# Patient Record
Sex: MALE | Race: WHITE | NOT HISPANIC OR LATINO | Employment: FULL TIME | URBAN - METROPOLITAN AREA
[De-identification: names, ages, dates, MRNs, and addresses within clinical notes are randomized per-mention and may not be internally consistent; named-entity substitution may affect disease eponyms.]

---

## 2023-11-29 ENCOUNTER — OFFICE VISIT (OUTPATIENT)
Dept: FAMILY MEDICINE CLINIC | Facility: CLINIC | Age: 28
End: 2023-11-29
Payer: COMMERCIAL

## 2023-11-29 VITALS
OXYGEN SATURATION: 97 % | BODY MASS INDEX: 35.31 KG/M2 | DIASTOLIC BLOOD PRESSURE: 94 MMHG | HEIGHT: 76 IN | TEMPERATURE: 97.6 F | WEIGHT: 290 LBS | HEART RATE: 113 BPM | SYSTOLIC BLOOD PRESSURE: 174 MMHG

## 2023-11-29 DIAGNOSIS — F41.0 PANIC ATTACKS: ICD-10-CM

## 2023-11-29 DIAGNOSIS — R41.840 DIFFICULTY CONCENTRATING: ICD-10-CM

## 2023-11-29 DIAGNOSIS — R03.0 ELEVATED BLOOD PRESSURE READING: ICD-10-CM

## 2023-11-29 DIAGNOSIS — F41.9 ANXIETY: Primary | ICD-10-CM

## 2023-11-29 PROCEDURE — 99203 OFFICE O/P NEW LOW 30 MIN: CPT | Performed by: PHYSICIAN ASSISTANT

## 2023-11-29 RX ORDER — ESCITALOPRAM OXALATE 5 MG/1
5 TABLET ORAL DAILY
Qty: 30 TABLET | Refills: 5 | Status: SHIPPED | OUTPATIENT
Start: 2023-11-29

## 2023-11-29 RX ORDER — ALPRAZOLAM 0.5 MG/1
0.5 TABLET ORAL DAILY PRN
Qty: 15 TABLET | Refills: 0 | Status: SHIPPED | OUTPATIENT
Start: 2023-11-29

## 2023-11-29 NOTE — PROGRESS NOTES
Name: Ranjan Wise      : 1995      MRN: [id number]  Encounter Provider: Mary Muñoz PA-C  Encounter Date: 2023   Encounter department: [facility] PRIMARY CARE    Assessment & Plan     Patient Instructions   Assessment/plan:  1. Anxiety-patient is having frequent panic attacks. We discussed treatment options and he is agreeable to starting Lexapro 5 mg daily. We will also order some blood test to be completed including CBC, CMP, lipids, and TSH. We will schedule follow-up in the next 4 weeks to reevaluate therapy and titrate if necessary. Patient is also having panic attacks to the point where he has called EMS. He will be given a small supply of Xanax 0.5 mg to be used as necessary for this situation. Patient verbalizes understanding and agreement with plan. 2.  Hypertension-not at goal.  Numbers are likely elevated secondary to anxiety. We discussed the possibility of adding metoprolol if they are continuingly elevated. I would recommend he try to get a home cuff so he can monitor more frequently. 3.  Decreased concentration-likely secondary to anxiety symptoms. If symptoms are persistent and not improving with Lexapro therapy or consider further evaluation by psychiatrist for ADHD evaluation. Follow-up with blood work results in the next 4 weeks. 1. Anxiety  -     CBC and differential  -     Comprehensive metabolic panel  -     Lipid Panel with Direct LDL reflex  -     TSH, 3rd generation with Free T4 reflex    2. Panic attacks  -     Comprehensive metabolic panel  -     Lipid Panel with Direct LDL reflex  -     TSH, 3rd generation with Free T4 reflex  -     escitalopram (LEXAPRO) 5 mg tablet; Take 1 tablet (5 mg total) by mouth daily  -     ALPRAZolam (XANAX) 0.5 mg tablet; Take 1 tablet (0.5 mg total) by mouth daily as needed for anxiety    3. Elevated blood pressure reading    4.  Difficulty concentrating        Depression Screening and Follow-up Plan: Patient was screened for depression during today's encounter. They screened negative with a PHQ-2 score of 0. Subjective      HPI: This is a 67-year-old gentleman that presents to the office as a new patient to discuss panic attacks. Patient has been having more frequent episodes, they have also been more severe. He had one the other week that he actually had to go to the hospital by EMS because of his symptoms being so significant. They eventually resolved on their own. His blood pressure was quite elevated at the time of his panic attack however and he wanted to follow-up with that as well. He has been trying to reduce caffeine and energy drinks in his diet. He is also stopped previous use of nicotine supplements or pouches. He does smoke medical marijuana for anxiety symptoms but sometimes feels that it actually makes his anxiety worse. He does admit to some family history of anxiety in his father. Typically anxiety attacks are spontaneous and do not seem to be brought on by anything particular. For the most part he does sleep okay at nighttime. Review of Systems   Constitutional:  Negative for chills, fatigue and fever. HENT:  Negative for congestion, ear pain and sinus pressure. Eyes:  Negative for visual disturbance. Respiratory:  Negative for cough, chest tightness and shortness of breath. Cardiovascular:  Negative for chest pain and palpitations. Gastrointestinal:  Negative for diarrhea, nausea and vomiting. Endocrine: Negative for polyuria. Genitourinary:  Negative for dysuria and frequency. Musculoskeletal:  Negative for arthralgias and myalgias. Skin:  Negative for pallor and rash. Neurological:  Negative for dizziness, weakness, light-headedness, numbness and headaches. Psychiatric/Behavioral:  Positive for decreased concentration. Negative for agitation, behavioral problems, self-injury, sleep disturbance and suicidal ideas. The patient is nervous/anxious.     All other systems reviewed and are negative. Current Outpatient Medications on File Prior to Visit   Medication Sig   • patient supplied medication AG1 supplement       Objective     BP (!) 174/94 (BP Location: Left arm, Patient Position: Sitting, Cuff Size: Large)   Pulse (!) 113   Temp 97.6 °F (36.4 °C) (Temporal)   Ht 6' 4" (1.93 m)   Wt 132 kg (290 lb)   SpO2 97%   BMI 35.30 kg/m²     Physical Exam  Vitals and nursing note reviewed. Constitutional:       General: He is not in acute distress. Appearance: He is well-developed. HENT:      Head: Normocephalic and atraumatic. Right Ear: External ear normal.      Left Ear: External ear normal.      Nose: Nose normal.      Mouth/Throat:      Pharynx: No oropharyngeal exudate. Eyes:      Conjunctiva/sclera: Conjunctivae normal.      Pupils: Pupils are equal, round, and reactive to light. Neck:      Thyroid: No thyromegaly. Trachea: No tracheal deviation. Cardiovascular:      Rate and Rhythm: Normal rate and regular rhythm. Heart sounds: Normal heart sounds. No murmur heard. No friction rub. Pulmonary:      Effort: Pulmonary effort is normal. No respiratory distress. Breath sounds: Normal breath sounds. No wheezing or rales. Abdominal:      General: Bowel sounds are normal. There is no distension. Palpations: Abdomen is soft. Tenderness: There is no abdominal tenderness. There is no guarding or rebound. Musculoskeletal:         General: No tenderness. Normal range of motion. Cervical back: Normal range of motion and neck supple. Lymphadenopathy:      Cervical: No cervical adenopathy. Skin:     General: Skin is warm and dry. Findings: No erythema or rash. Neurological:      Mental Status: He is alert and oriented to person, place, and time. Cranial Nerves: No cranial nerve deficit.       Coordination: Coordination normal.   Psychiatric:         Behavior: Behavior normal.         Thought Content: Thought content normal.       Manuel Reyes PA-C

## 2023-11-29 NOTE — PATIENT INSTRUCTIONS
Assessment/plan:  1. Anxiety-patient is having frequent panic attacks. We discussed treatment options and he is agreeable to starting Lexapro 5 mg daily. We will also order some blood test to be completed including CBC, CMP, lipids, and TSH. We will schedule follow-up in the next 4 weeks to reevaluate therapy and titrate if necessary. Patient is also having panic attacks to the point where he has called EMS. He will be given a small supply of Xanax 0.5 mg to be used as necessary for this situation. Patient verbalizes understanding and agreement with plan. 2.  Hypertension-not at goal.  Numbers are likely elevated secondary to anxiety. We discussed the possibility of adding metoprolol if they are continuingly elevated. I would recommend he try to get a home cuff so he can monitor more frequently. 3.  Decreased concentration-likely secondary to anxiety symptoms. If symptoms are persistent and not improving with Lexapro therapy or consider further evaluation by psychiatrist for ADHD evaluation. Follow-up with blood work results in the next 4 weeks.

## 2023-11-29 NOTE — PROGRESS NOTES
ADULT ANNUAL [address] PRIMARY CARE    NAME: Nelsy Abel  AGE: 33 y.o. SEX: male  : 1995     DATE: 2023     Assessment and Plan:     Problem List Items Addressed This Visit    None  Visit Diagnoses     Panic attacks    -  Primary    Healthcare maintenance              Immunizations and preventive care screenings were discussed with patient today. Appropriate education was printed on patient's after visit summary. {Prostate cancer screening - consider in males between age of 37-78 depending on age, race, and risk factors. There is difference in the guidelines in regards to the optimal age for screening. USPSTF states to consider periodic screening in males between the age of 48 to 71. This text is informational and does not need to be selected but if you wish, you can insert standard documentation in your progress note by using F2 (Optional):14173}    Counseling:  {Annual Physical; Counselin}      Depression Screening and Follow-up Plan: Patient was screened for depression during today's encounter. They screened negative with a PHQ-2 score of 0. No follow-ups on file. Chief Complaint:     Chief Complaint   Patient presents with   • Physical Exam     Pt wants to discuss panic attacks stating he had one a couple weeks ago that he had to call EMS. DEMARCUS 7 screen done. mgb      History of Present Illness:     Adult Annual Physical   Patient here for a comprehensive physical exam. The patient reports {problems:54295}. Diet and Physical Activity  Diet/Nutrition: {annual physical; diet:33515953}. Exercise: {annual physical; exercise:70369014}.       Depression Screening  PHQ-2/9 Depression Screening    Little interest or pleasure in doing things: 0 - not at all  Feeling down, depressed, or hopeless: 0 - not at all  PHQ-2 Score: 0  PHQ-2 Interpretation: Negative depression screen       General Health  Sleep: {annual physical; sleep:2102}. Hearing: {annual physical; hearin}. Vision: {annual physical; vision:}. Dental: {annual physical; dental:}.  Health  Symptoms include: {annual physical; urinary symptoms:17921::"none"}    Advanced Care Planning  Do you have an advanced directive? {YES/NO:}  Do you have a durable medical power of ? {YES/NO:}     Review of Systems:     Review of Systems   Past Medical History:     No past medical history on file. Past Surgical History:     No past surgical history on file. Family History:     No family history on file. Social History:     Social History     Socioeconomic History   • Marital status: Single     Spouse name: Not on file   • Number of children: Not on file   • Years of education: Not on file   • Highest education level: Not on file   Occupational History   • Not on file   Tobacco Use   • Smoking status: Not on file   • Smokeless tobacco: Not on file   Substance and Sexual Activity   • Alcohol use: Not on file   • Drug use: Not on file   • Sexual activity: Not on file   Other Topics Concern   • Not on file   Social History Narrative   • Not on file     Social Determinants of Health     Financial Resource Strain: Not on file   Food Insecurity: Not on file   Transportation Needs: Not on file   Physical Activity: Not on file   Stress: Not on file   Social Connections: Not on file   Intimate Partner Violence: Not on file   Housing Stability: Not on file      Current Medications:     Current Outpatient Medications   Medication Sig Dispense Refill   • patient supplied medication AG1 supplement       No current facility-administered medications for this visit.       Allergies:     No Known Allergies   Physical Exam:     BP (!) 174/94 (BP Location: Left arm, Patient Position: Sitting, Cuff Size: Large)   Pulse (!) 113   Temp 97.6 °F (36.4 °C) (Temporal)   Ht 6' 4" (1.93 m)   Wt 132 kg (290 lb)   SpO2 97%   BMI 35.30 kg/m² Physical Exam     Manuel Reyes PA-C  [facility] PRIMARY CARE

## 2023-12-28 LAB
ALBUMIN SERPL-MCNC: 4.7 G/DL (ref 3.6–5.1)
ALBUMIN/GLOB SERPL: 1.7 (CALC) (ref 1–2.5)
ALP SERPL-CCNC: 74 U/L (ref 36–130)
ALT SERPL-CCNC: 55 U/L (ref 9–46)
AST SERPL-CCNC: 30 U/L (ref 10–40)
BASOPHILS # BLD AUTO: 40 CELLS/UL (ref 0–200)
BASOPHILS NFR BLD AUTO: 0.5 %
BILIRUB SERPL-MCNC: 1.7 MG/DL (ref 0.2–1.2)
BUN SERPL-MCNC: 8 MG/DL (ref 7–25)
BUN/CREAT SERPL: ABNORMAL (CALC) (ref 6–22)
CALCIUM SERPL-MCNC: 9.8 MG/DL (ref 8.6–10.3)
CHLORIDE SERPL-SCNC: 102 MMOL/L (ref 98–110)
CHOLEST SERPL-MCNC: 231 MG/DL
CHOLEST/HDLC SERPL: 6.1 (CALC)
CO2 SERPL-SCNC: 28 MMOL/L (ref 20–32)
CREAT SERPL-MCNC: 0.81 MG/DL (ref 0.6–1.24)
EOSINOPHIL # BLD AUTO: 166 CELLS/UL (ref 15–500)
EOSINOPHIL NFR BLD AUTO: 2.1 %
ERYTHROCYTE [DISTWIDTH] IN BLOOD BY AUTOMATED COUNT: 12.2 % (ref 11–15)
GFR/BSA.PRED SERPLBLD CYS-BASED-ARV: 122 ML/MIN/1.73M2
GLOBULIN SER CALC-MCNC: 2.7 G/DL (CALC) (ref 1.9–3.7)
GLUCOSE SERPL-MCNC: 109 MG/DL (ref 65–99)
HCT VFR BLD AUTO: 47.5 % (ref 38.5–50)
HDLC SERPL-MCNC: 38 MG/DL
HGB BLD-MCNC: 16.6 G/DL (ref 13.2–17.1)
LDLC SERPL CALC-MCNC: 157 MG/DL (CALC)
LYMPHOCYTES # BLD AUTO: 1730 CELLS/UL (ref 850–3900)
LYMPHOCYTES NFR BLD AUTO: 21.9 %
MCH RBC QN AUTO: 28.2 PG (ref 27–33)
MCHC RBC AUTO-ENTMCNC: 34.9 G/DL (ref 32–36)
MCV RBC AUTO: 80.8 FL (ref 80–100)
MONOCYTES # BLD AUTO: 577 CELLS/UL (ref 200–950)
MONOCYTES NFR BLD AUTO: 7.3 %
NEUTROPHILS # BLD AUTO: 5388 CELLS/UL (ref 1500–7800)
NEUTROPHILS NFR BLD AUTO: 68.2 %
NONHDLC SERPL-MCNC: 193 MG/DL (CALC)
PLATELET # BLD AUTO: 417 THOUSAND/UL (ref 140–400)
PMV BLD REES-ECKER: 9.7 FL (ref 7.5–12.5)
POTASSIUM SERPL-SCNC: 3.5 MMOL/L (ref 3.5–5.3)
PROT SERPL-MCNC: 7.4 G/DL (ref 6.1–8.1)
RBC # BLD AUTO: 5.88 MILLION/UL (ref 4.2–5.8)
SODIUM SERPL-SCNC: 140 MMOL/L (ref 135–146)
TRIGL SERPL-MCNC: 204 MG/DL
TSH SERPL-ACNC: 1.28 MIU/L (ref 0.4–4.5)
WBC # BLD AUTO: 7.9 THOUSAND/UL (ref 3.8–10.8)

## 2024-01-03 ENCOUNTER — OFFICE VISIT (OUTPATIENT)
Dept: FAMILY MEDICINE CLINIC | Facility: CLINIC | Age: 29
End: 2024-01-03
Payer: COMMERCIAL

## 2024-01-03 VITALS
WEIGHT: 294 LBS | SYSTOLIC BLOOD PRESSURE: 164 MMHG | DIASTOLIC BLOOD PRESSURE: 86 MMHG | OXYGEN SATURATION: 95 % | HEART RATE: 104 BPM | HEIGHT: 76 IN | BODY MASS INDEX: 35.8 KG/M2

## 2024-01-03 DIAGNOSIS — F41.9 ANXIETY: ICD-10-CM

## 2024-01-03 DIAGNOSIS — R73.9 HYPERGLYCEMIA: ICD-10-CM

## 2024-01-03 DIAGNOSIS — Z00.00 HEALTHCARE MAINTENANCE: Primary | ICD-10-CM

## 2024-01-03 DIAGNOSIS — E78.2 MIXED HYPERLIPIDEMIA: ICD-10-CM

## 2024-01-03 DIAGNOSIS — R74.01 ELEVATED ALT MEASUREMENT: ICD-10-CM

## 2024-01-03 PROBLEM — E66.9 OBESITY (BMI 35.0-39.9 WITHOUT COMORBIDITY): Status: ACTIVE | Noted: 2024-01-03

## 2024-01-03 PROCEDURE — 99395 PREV VISIT EST AGE 18-39: CPT | Performed by: PHYSICIAN ASSISTANT

## 2024-01-03 PROCEDURE — 99214 OFFICE O/P EST MOD 30 MIN: CPT | Performed by: PHYSICIAN ASSISTANT

## 2024-01-03 NOTE — PROGRESS NOTES
Name: Adams Castillo      : 1995      MRN: [id number]  Encounter Provider: Gary Cortes PA-C  Encounter Date: 1/3/2024   Encounter department: [facility] PRIMARY CARE    Assessment & Plan     Patient Instructions   Assessment/plan:  1.  Healthcare maintenance-vaccines reviewed.  Routine screenings reviewed.  Discussed healthy diet and exercise.  2.  Hyperglycemia-fasting glucose of 109.  Continue healthy diet and exercise efforts.  3.  Mixed hyperlipidemia-total cholesterol 231 with LDL of 157.  Diet and exercise recommended.  Will reassess in 6 months and consider statin therapy if necessary.  4.  Elevated ALT measurement-likely secondary to fatty liver-recommend reassessing in 6 months.  If continually elevated will ultrasound.  5.  Anxiety-patient seems to be improving with Lexapro 5 mg daily.  Will continue current therapy.  He continues use of Xanax as necessary.  He was given 15 tablets at the end of November and still has some left.  He uses primarily for panic attack or travel.    1. Healthcare maintenance    2. Hyperglycemia  -     Hemoglobin A1C; Future; Expected date: 2024    3. Mixed hyperlipidemia  -     Lipid Panel with Direct LDL reflex; Future; Expected date: 2024    4. Elevated ALT measurement  -     Comprehensive metabolic panel; Future; Expected date: 2024    5. Anxiety           Subjective      HPI: This is a 28-year-old gentleman that presents to the office for health maintenance physical and follow-up of recent blood work.  He has had some abnormalities of the blood work he would like to discuss in more detail.  He also was started on anxiety medication with Lexapro 5 mg daily at the end of November.  He does feel that overall the medication has been helpful.  He is interested in continuing.  He has experienced a little bit of general fatigue with the medication but seems to be doing better with nighttime dosing.      Review of Systems   Constitutional:   "Negative for chills, fatigue and fever.   HENT:  Negative for congestion, ear pain and sinus pressure.    Eyes:  Negative for visual disturbance.   Respiratory:  Negative for cough, chest tightness and shortness of breath.    Cardiovascular:  Negative for chest pain and palpitations.   Gastrointestinal:  Negative for diarrhea, nausea and vomiting.   Endocrine: Negative for polyuria.   Genitourinary:  Negative for dysuria and frequency.   Musculoskeletal:  Negative for arthralgias and myalgias.   Skin:  Negative for pallor and rash.   Neurological:  Negative for dizziness, weakness, light-headedness, numbness and headaches.   Psychiatric/Behavioral:  Negative for agitation, behavioral problems and sleep disturbance.    All other systems reviewed and are negative.      Current Outpatient Medications on File Prior to Visit   Medication Sig   • ALPRAZolam (XANAX) 0.5 mg tablet Take 1 tablet (0.5 mg total) by mouth daily as needed for anxiety   • escitalopram (LEXAPRO) 5 mg tablet Take 1 tablet (5 mg total) by mouth daily   • patient supplied medication AG1 supplement       Objective     /86 (BP Location: Left arm, Patient Position: Sitting, Cuff Size: Large)   Pulse 104   Ht 6' 4\" (1.93 m)   Wt 133 kg (294 lb)   SpO2 95%   BMI 35.79 kg/m²     Physical Exam  Vitals and nursing note reviewed.   Constitutional:       General: He is not in acute distress.     Appearance: He is well-developed.   HENT:      Head: Normocephalic and atraumatic.      Right Ear: External ear normal.      Left Ear: External ear normal.      Nose: Nose normal.      Mouth/Throat:      Pharynx: No oropharyngeal exudate.   Eyes:      Conjunctiva/sclera: Conjunctivae normal.      Pupils: Pupils are equal, round, and reactive to light.   Neck:      Thyroid: No thyromegaly.      Trachea: No tracheal deviation.   Cardiovascular:      Rate and Rhythm: Normal rate and regular rhythm.      Heart sounds: Normal heart sounds. No murmur heard.     No " friction rub.   Pulmonary:      Effort: Pulmonary effort is normal. No respiratory distress.      Breath sounds: Normal breath sounds. No wheezing or rales.   Abdominal:      General: Bowel sounds are normal. There is no distension.      Palpations: Abdomen is soft.      Tenderness: There is no abdominal tenderness. There is no guarding or rebound.   Musculoskeletal:         General: No tenderness. Normal range of motion.      Cervical back: Normal range of motion and neck supple.   Lymphadenopathy:      Cervical: No cervical adenopathy.   Skin:     General: Skin is warm and dry.      Findings: No erythema or rash.   Neurological:      Mental Status: He is alert and oriented to person, place, and time.      Cranial Nerves: No cranial nerve deficit.      Coordination: Coordination normal.   Psychiatric:         Behavior: Behavior normal.         Thought Content: Thought content normal.       Gary Cortes PA-C

## 2024-01-03 NOTE — PATIENT INSTRUCTIONS
Assessment/plan:  1.  Healthcare maintenance-vaccines reviewed.  Routine screenings reviewed.  Discussed healthy diet and exercise.  2.  Hyperglycemia-fasting glucose of 109.  Continue healthy diet and exercise efforts.  3.  Mixed hyperlipidemia-total cholesterol 231 with LDL of 157.  Diet and exercise recommended.  Will reassess in 6 months and consider statin therapy if necessary.  4.  Elevated ALT measurement-likely secondary to fatty liver-recommend reassessing in 6 months.  If continually elevated will ultrasound.  5.  Anxiety-patient seems to be improving with Lexapro 5 mg daily.  Will continue current therapy.  He continues use of Xanax as necessary.  He was given 15 tablets at the end of November and still has some left.  He uses primarily for panic attack or travel.

## 2024-01-03 NOTE — PROGRESS NOTES
ADULT ANNUAL PHYSICAL  [facility] PRIMARY CARE    NAME: Adams Castillo  AGE: 28 y.o. SEX: male  : 1995     DATE: 1/3/2024     Assessment and Plan:     Problem List Items Addressed This Visit        Other    Elevated ALT measurement    Hyperglycemia    Mixed hyperlipidemia   Other Visit Diagnoses     Healthcare maintenance    -  Primary          Immunizations and preventive care screenings were discussed with patient today. Appropriate education was printed on patient's after visit summary.    Counseling:  {Annual Physical; Counselin}         No follow-ups on file.     Chief Complaint:     Chief Complaint   Patient presents with   • Physical Exam     Pt  present today for his annual physical exam.      History of Present Illness:     Adult Annual Physical   Patient here for a comprehensive physical exam. The patient reports {problems:43554}.    Diet and Physical Activity  Diet/Nutrition: {annual physical; diet:36600829}.   Exercise: {annual physical; exercise:59199916}.      Depression Screening  PHQ-2/9 Depression Screening         General Health  Sleep: {annual physical; sleep:13764659}.   Hearing: {annual physical; hearin}.  Vision: {annual physical; vision:42907256}.   Dental: {annual physical; dental:20739285}.        Health  History of STDs?: {yes/no:59611}.    Advanced Care Planning  Do you have an advanced directive? {YES/NO:}  Do you have a durable medical power of ? {YES/NO:}     Review of Systems:     Review of Systems   Past Medical History:     History reviewed. No pertinent past medical history.   Past Surgical History:     History reviewed. No pertinent surgical history.   Social History:     Social History     Socioeconomic History   • Marital status: Single     Spouse name: None   • Number of children: None   • Years of education: None   • Highest education level: None   Occupational History   • None   Tobacco  "Use   • Smoking status: Never   • Smokeless tobacco: Former   Substance and Sexual Activity   • Alcohol use: None   • Drug use: None   • Sexual activity: None   Other Topics Concern   • None   Social History Narrative   • None     Social Determinants of Health     Financial Resource Strain: Not on file   Food Insecurity: Not on file   Transportation Needs: Not on file   Physical Activity: Not on file   Stress: Not on file   Social Connections: Not on file   Intimate Partner Violence: Not on file   Housing Stability: Not on file      Family History:     History reviewed. No pertinent family history.   Current Medications:     Current Outpatient Medications   Medication Sig Dispense Refill   • ALPRAZolam (XANAX) 0.5 mg tablet Take 1 tablet (0.5 mg total) by mouth daily as needed for anxiety 15 tablet 0   • escitalopram (LEXAPRO) 5 mg tablet Take 1 tablet (5 mg total) by mouth daily 30 tablet 5   • patient supplied medication AG1 supplement       No current facility-administered medications for this visit.      Allergies:     No Known Allergies   Physical Exam:     /86 (BP Location: Left arm, Patient Position: Sitting, Cuff Size: Large)   Pulse 104   Ht 6' 4\" (1.93 m)   Wt 133 kg (294 lb)   SpO2 95%   BMI 35.79 kg/m²     Physical Exam     Gary Cortes PA-C   [facility] PRIMARY CARE    "

## 2024-03-14 DIAGNOSIS — F41.0 PANIC ATTACKS: ICD-10-CM

## 2024-03-14 RX ORDER — ALPRAZOLAM 0.5 MG/1
0.5 TABLET ORAL DAILY PRN
Qty: 15 TABLET | Refills: 0 | Status: SHIPPED | OUTPATIENT
Start: 2024-03-14

## 2024-03-14 NOTE — TELEPHONE ENCOUNTER
Reason for call:   [x] Refill   [] Prior Auth  [] Other:     Office:   [x] PCP/Provider -   [] Specialty/Provider -     Medication: xanax 0.5 mg, daily PRN, 30 tabs       Pharmacy: [facility]    Does the patient have enough for 3 days?   [] Yes   [x] No - Send as HP to POD

## 2024-05-14 DIAGNOSIS — F41.0 PANIC ATTACKS: ICD-10-CM

## 2024-05-14 RX ORDER — ALPRAZOLAM 0.5 MG/1
0.5 TABLET ORAL DAILY PRN
Qty: 15 TABLET | Refills: 0 | Status: SHIPPED | OUTPATIENT
Start: 2024-05-14

## 2024-05-23 DIAGNOSIS — F41.0 PANIC ATTACKS: ICD-10-CM

## 2024-05-23 RX ORDER — ESCITALOPRAM OXALATE 5 MG/1
5 TABLET ORAL DAILY
Qty: 90 TABLET | Refills: 1 | Status: SHIPPED | OUTPATIENT
Start: 2024-05-23

## 2024-07-19 ENCOUNTER — APPOINTMENT (OUTPATIENT)
Dept: RADIOLOGY | Facility: MEDICAL CENTER | Age: 29
End: 2024-07-19
Payer: COMMERCIAL

## 2024-07-19 ENCOUNTER — OFFICE VISIT (OUTPATIENT)
Dept: URGENT CARE | Facility: MEDICAL CENTER | Age: 29
End: 2024-07-19
Payer: COMMERCIAL

## 2024-07-19 VITALS
HEIGHT: 76 IN | OXYGEN SATURATION: 100 % | HEART RATE: 93 BPM | BODY MASS INDEX: 33.29 KG/M2 | RESPIRATION RATE: 20 BRPM | TEMPERATURE: 98.1 F | DIASTOLIC BLOOD PRESSURE: 100 MMHG | SYSTOLIC BLOOD PRESSURE: 180 MMHG | WEIGHT: 273.4 LBS

## 2024-07-19 DIAGNOSIS — I10 ESSENTIAL HYPERTENSION: ICD-10-CM

## 2024-07-19 DIAGNOSIS — S43.401A SPRAIN OF RIGHT SHOULDER, UNSPECIFIED SHOULDER SPRAIN TYPE, INITIAL ENCOUNTER: Primary | ICD-10-CM

## 2024-07-19 DIAGNOSIS — M25.511 ACUTE PAIN OF RIGHT SHOULDER: ICD-10-CM

## 2024-07-19 PROCEDURE — 73030 X-RAY EXAM OF SHOULDER: CPT

## 2024-07-19 PROCEDURE — G0382 LEV 3 HOSP TYPE B ED VISIT: HCPCS | Performed by: PHYSICIAN ASSISTANT

## 2024-07-19 RX ORDER — PREDNISONE 10 MG/1
TABLET ORAL
Qty: 30 TABLET | Refills: 0 | Status: SHIPPED | OUTPATIENT
Start: 2024-07-19

## 2024-07-19 NOTE — PROGRESS NOTES
"[facility]        NAME: Adams Castillo is a 29 y.o. male  : 1995    MRN: [id number]  DATE: 2024  TIME: 2:34 PM    BP (!) 180/100 Comment: manual  Pulse 93   Temp 98.1 °F (36.7 °C) (Tympanic)   Resp 20   Ht 6' 4\" (1.93 m)   Wt 124 kg (273 lb 6.4 oz)   SpO2 100%   BMI 33.28 kg/m²     Assessment and Plan   Sprain of right shoulder, unspecified shoulder sprain type, initial encounter [S43.401A]  1. Sprain of right shoulder, unspecified shoulder sprain type, initial encounter  XR shoulder 2+ vw right    Ambulatory referral to Orthopedic Surgery    predniSONE 10 mg tablet      2. Essential hypertension  Ambulatory referral to Orthopedic Surgery    predniSONE 10 mg tablet            Patient Instructions       Follow up with PCP in 3-5 days.  Proceed to  ER if symptoms worsen.    Chief Complaint     Chief Complaint   Patient presents with    Shoulder Pain     Pt c/o pain in right shoulder after 2 incidences performing the same activity - ~ 3-5 weeks ago he through a disc (disc golf)with his right arm and  \"over extended\" his arm and felt a pop.  Rested the shoulder and then felt the same pain yesterday after performing the same throw.  Denies previous injury to same         History of Present Illness       Pt with right shoulder pain , injury with throwing 1 month ago, + improved, same event several days ago  with throwing  several days ago         Review of Systems   Review of Systems   Constitutional: Negative.    HENT: Negative.     Eyes: Negative.    Respiratory: Negative.     Cardiovascular: Negative.    Gastrointestinal: Negative.    Endocrine: Negative.    Genitourinary: Negative.    Musculoskeletal: Negative.    Skin: Negative.    Allergic/Immunologic: Negative.    Neurological: Negative.    Hematological: Negative.    Psychiatric/Behavioral: Negative.     All other systems reviewed and are negative.        Current Medications       Current Outpatient Medications:     ALPRAZolam " "(XANAX) 0.5 mg tablet, Take 1 tablet (0.5 mg total) by mouth daily as needed for anxiety, Disp: 15 tablet, Rfl: 0    escitalopram (LEXAPRO) 5 mg tablet, TAKE 1 TABLET (5 MG TOTAL) BY MOUTH DAILY., Disp: 90 tablet, Rfl: 1    patient supplied medication, AG1 supplement, Disp: , Rfl:     predniSONE 10 mg tablet, 5 tabs po qd x 2 days then 4 tabs po qd x 2 days then 3 tabs po qd x 2 days then 2 tabs po qd x 2 days then 1 tab po qd x 2 days, Disp: 30 tablet, Rfl: 0    Current Allergies     Allergies as of 07/19/2024    (No Known Allergies)            The following portions of the patient's history were reviewed and updated as appropriate: allergies, current medications, past family history, past medical history, past social history, past surgical history and problem list.     History reviewed. No pertinent past medical history.    History reviewed. No pertinent surgical history.    History reviewed. No pertinent family history.      Medications have been verified.        Objective   BP (!) 180/100 Comment: manual  Pulse 93   Temp 98.1 °F (36.7 °C) (Tympanic)   Resp 20   Ht 6' 4\" (1.93 m)   Wt 124 kg (273 lb 6.4 oz)   SpO2 100%   BMI 33.28 kg/m²        Physical Exam     Physical Exam  Vitals and nursing note reviewed.   Constitutional:       Appearance: Normal appearance. He is normal weight.      Comments: Pt is being watched for htn by family doctor at this time    HENT:      Head: Normocephalic and atraumatic.   Cardiovascular:      Rate and Rhythm: Normal rate and regular rhythm.      Pulses: Normal pulses.      Heart sounds: Normal heart sounds.   Pulmonary:      Effort: Pulmonary effort is normal.      Breath sounds: Normal breath sounds.   Abdominal:      Palpations: Abdomen is soft.   Musculoskeletal:         General: Normal range of motion.      Cervical back: Normal range of motion and neck supple.      Comments: Right shoulder from   no clavicle pain no ac joint pain  humeral head tenderness  pectoralis " insertion tender   right elbow from distal neuro and vascular wnl    Skin:     General: Skin is warm.      Capillary Refill: Capillary refill takes more than 3 seconds.   Neurological:      Mental Status: He is alert and oriented to person, place, and time.   Psychiatric:         Behavior: Behavior normal.

## 2024-07-22 DIAGNOSIS — F41.0 PANIC ATTACKS: ICD-10-CM

## 2024-07-22 RX ORDER — ALPRAZOLAM 0.5 MG/1
0.5 TABLET ORAL DAILY PRN
Qty: 15 TABLET | Refills: 0 | Status: SHIPPED | OUTPATIENT
Start: 2024-07-22

## 2024-07-24 VITALS — BODY MASS INDEX: 33.24 KG/M2 | HEIGHT: 76 IN | WEIGHT: 273 LBS

## 2024-07-24 DIAGNOSIS — M75.21 BICEPS TENDINITIS OF RIGHT SHOULDER: ICD-10-CM

## 2024-07-24 PROCEDURE — 99203 OFFICE O/P NEW LOW 30 MIN: CPT | Performed by: FAMILY MEDICINE

## 2024-09-01 DIAGNOSIS — F41.0 PANIC ATTACKS: ICD-10-CM

## 2024-09-03 RX ORDER — ALPRAZOLAM 0.5 MG
0.5 TABLET ORAL DAILY PRN
Qty: 15 TABLET | Refills: 0 | Status: SHIPPED | OUTPATIENT
Start: 2024-09-03

## 2024-09-03 NOTE — TELEPHONE ENCOUNTER
Requested Prescriptions     Pending Prescriptions Disp Refills    ALPRAZolam (XANAX) 0.5 mg tablet 15 tablet 0     Sig: Take 1 tablet (0.5 mg total) by mouth daily as needed for anxiety      LOV 1/3/24 F/U 9/3/24 labs ACtive

## 2024-10-23 DIAGNOSIS — F41.0 PANIC ATTACKS: ICD-10-CM

## 2024-10-23 NOTE — TELEPHONE ENCOUNTER
Requested Prescriptions     Pending Prescriptions Disp Refills    ALPRAZolam (XANAX) 0.5 mg tablet 15 tablet 0     Sig: Take 1 tablet (0.5 mg total) by mouth daily as needed for anxiety      LOV 1/3/24 F/U 12/10/24 Labs Active

## 2024-10-24 RX ORDER — ALPRAZOLAM 0.5 MG
0.5 TABLET ORAL DAILY PRN
Qty: 15 TABLET | Refills: 0 | Status: SHIPPED | OUTPATIENT
Start: 2024-10-24

## 2024-12-08 DIAGNOSIS — F41.0 PANIC ATTACKS: ICD-10-CM

## 2024-12-11 RX ORDER — ESCITALOPRAM OXALATE 5 MG/1
5 TABLET ORAL DAILY
Qty: 90 TABLET | Refills: 1 | Status: SHIPPED | OUTPATIENT
Start: 2024-12-11

## 2024-12-19 DIAGNOSIS — F41.0 PANIC ATTACKS: ICD-10-CM

## 2024-12-20 RX ORDER — ALPRAZOLAM 0.5 MG
0.5 TABLET ORAL DAILY PRN
Qty: 15 TABLET | Refills: 0 | Status: SHIPPED | OUTPATIENT
Start: 2024-12-20

## 2025-01-28 DIAGNOSIS — F41.0 PANIC ATTACKS: ICD-10-CM

## 2025-01-28 NOTE — TELEPHONE ENCOUNTER
Requested Prescriptions     Pending Prescriptions Disp Refills    ALPRAZolam (XANAX) 0.5 mg tablet 15 tablet 0     Sig: Take 1 tablet (0.5 mg total) by mouth daily as needed for anxiety      LOV 1/3/24 F/U 2/18/25 Labs Active

## 2025-01-30 RX ORDER — ALPRAZOLAM 0.5 MG
0.5 TABLET ORAL DAILY PRN
Qty: 15 TABLET | Refills: 0 | Status: SHIPPED | OUTPATIENT
Start: 2025-01-30

## 2025-03-11 DIAGNOSIS — F41.0 PANIC ATTACKS: ICD-10-CM

## 2025-03-11 RX ORDER — ALPRAZOLAM 0.5 MG
0.5 TABLET ORAL DAILY PRN
Qty: 15 TABLET | Refills: 0 | OUTPATIENT
Start: 2025-03-11

## 2025-03-17 ENCOUNTER — OFFICE VISIT (OUTPATIENT)
Dept: FAMILY MEDICINE CLINIC | Facility: CLINIC | Age: 30
End: 2025-03-17
Payer: COMMERCIAL

## 2025-03-17 VITALS
WEIGHT: 273 LBS | SYSTOLIC BLOOD PRESSURE: 138 MMHG | HEART RATE: 129 BPM | OXYGEN SATURATION: 100 % | HEIGHT: 76 IN | DIASTOLIC BLOOD PRESSURE: 88 MMHG | BODY MASS INDEX: 33.24 KG/M2

## 2025-03-17 DIAGNOSIS — E78.2 MIXED HYPERLIPIDEMIA: ICD-10-CM

## 2025-03-17 DIAGNOSIS — R03.0 BLOOD PRESSURE ELEVATED WITHOUT HISTORY OF HTN: ICD-10-CM

## 2025-03-17 DIAGNOSIS — D49.2 SKIN NEOPLASM: ICD-10-CM

## 2025-03-17 DIAGNOSIS — R73.9 HYPERGLYCEMIA: ICD-10-CM

## 2025-03-17 DIAGNOSIS — F41.9 ANXIETY: Primary | ICD-10-CM

## 2025-03-17 DIAGNOSIS — F41.0 PANIC ATTACKS: ICD-10-CM

## 2025-03-17 DIAGNOSIS — Z00.00 ANNUAL PHYSICAL EXAM: ICD-10-CM

## 2025-03-17 PROCEDURE — 99395 PREV VISIT EST AGE 18-39: CPT | Performed by: PHYSICIAN ASSISTANT

## 2025-03-17 PROCEDURE — 99214 OFFICE O/P EST MOD 30 MIN: CPT | Performed by: PHYSICIAN ASSISTANT

## 2025-03-17 RX ORDER — ESCITALOPRAM OXALATE 10 MG/1
10 TABLET ORAL DAILY
Qty: 30 TABLET | Refills: 0
Start: 2025-03-17

## 2025-03-17 RX ORDER — ALPRAZOLAM 0.5 MG
0.5 TABLET ORAL DAILY PRN
Qty: 15 TABLET | Refills: 2 | Status: SHIPPED | OUTPATIENT
Start: 2025-03-17

## 2025-03-17 NOTE — PROGRESS NOTES
Adult Annual Physical  Name: Adams Castillo      : 1995      MRN: [id number]  Encounter Provider: Gary Cortes PA-C  Encounter Date: 3/17/2025   Encounter department: [facility]    Assessment & Plan  Anxiety  Patient is still having some significant symptoms.  His symptoms are usually daily and he has been limiting Xanax use to water 2 times per week.  Patient does not feel that the Lexapro 5 mg works as well as it once did when he was off of it for several months and then restarted.  Recommend bumping it up to 10 mg daily.  Recommend reassessing in 6 weeks.  Orders:  •  CBC and differential  •  Comprehensive metabolic panel  •  Lipid Panel with Direct LDL reflex  •  TSH, 3rd generation with Free T4 reflex  •  Hemoglobin A1C    Hyperglycemia  Status unknown.  Recommend patient getting blood work done and following up in the next 6 weeks.  He does have some family history of diabetes present.  Orders:  •  CBC and differential  •  Comprehensive metabolic panel  •  Lipid Panel with Direct LDL reflex  •  TSH, 3rd generation with Free T4 reflex  •  Hemoglobin A1C    Blood pressure elevated without history of HTN  Blood pressure is borderline tonight.  Would recommend continued home monitoring.  Recommend trying to better TREAT anxiety and if not improved in 6 weeks when we follow-up and review labs consider adding beta-blocker therapy.       Mixed hyperlipidemia  Recommend reassessing labs.  Continue healthy diet and exercise.  Orders:  •  CBC and differential  •  Comprehensive metabolic panel  •  Lipid Panel with Direct LDL reflex  •  TSH, 3rd generation with Free T4 reflex  •  Hemoglobin A1C    Panic attacks  Presently stable.  Orders:  •  escitalopram (LEXAPRO) 10 mg tablet; Take 1 tablet (10 mg total) by mouth daily  •  ALPRAZolam (XANAX) 0.5 mg tablet; Take 1 tablet (0.5 mg total) by mouth daily as needed for anxiety    Skin neoplasm    Orders:  •  Ambulatory Referral to Dermatology;  Future    Annual physical exam         Preventive Screenings:  - Diabetes Screening: risks/benefits discussed and orders placed  - Cholesterol Screening: screening not indicated and has hyperlipidemia   - Hepatitis C screening: patient declines   - HIV screening: patient declines   - Colon cancer screening: screening not indicated   - Lung cancer screening: screening not indicated   - Prostate cancer screening: screening not indicated     Immunizations:  - Immunizations due: Influenza and Tdap      Depression Screening and Follow-up Plan: Patient was screened for depression during today's encounter. They screened negative with a PHQ-2 score of 0.          History of Present Illness     Adult Annual Physical:  Patient presents for annual physical. HPI: This is a 30-year-old gentleman that presents to the office for follow-up of chronic health conditions as well as annual physical exam.  He has a history of anxiety and was previously on Lexapro 5 mg daily.  He states he stopped the medication sometime ago and was off of it for several months.  He states he fell back into smoking and nicotine.  Around the holidays he started back on the Lexapro 5 mg and has been doing better and was able to stop smoking and doing nicotine since however he does not feel the 5 mg is working as well as it once did.  He is still having daily anxiety symptoms.  He uses Xanax 2 times per week to help alleviate symptoms of acute anxiety and has been trying to limit it to only twice per week.  He also has some significant family history of hypertension and notes that his blood pressure has been running a bit high when he has had it checked recently.  He does not have any chest pains or shortness of breath.  He also does have multiple skin lesions that he is interested in having yearly checkup with dermatologist for evaluation..     Diet and Physical Activity:  - Diet/Nutrition: no special diet and poor diet.  - Exercise: walking and 3-4 times  "a week on average.    Depression Screening:  - PHQ-2 Score: 0    General Health:  - Sleep: sleeps poorly and unrefreshing sleep.  - Vision: no vision problems.  - Dental: brushes teeth once daily.    /GYN Health:  - Follows with GYN: no.   - History of STDs: no     Health:  - History of STDs: no.   - Urinary symptoms: none.     Advanced Care Planning:  - Has an advanced directive?: no    - Has a durable medical POA?: no      Review of Systems   Constitutional:  Negative for chills, fatigue and fever.   HENT:  Negative for congestion, ear pain and sinus pressure.    Eyes:  Negative for visual disturbance.   Respiratory:  Negative for cough, chest tightness and shortness of breath.    Cardiovascular:  Negative for chest pain and palpitations.   Gastrointestinal:  Negative for diarrhea, nausea and vomiting.   Endocrine: Negative for polyuria.   Genitourinary:  Negative for dysuria and frequency.   Musculoskeletal:  Negative for arthralgias and myalgias.   Skin:  Negative for pallor and rash.   Neurological:  Negative for dizziness, weakness, light-headedness, numbness and headaches.   Psychiatric/Behavioral:  Negative for agitation, behavioral problems and sleep disturbance.    All other systems reviewed and are negative.        Objective   /88   Pulse (!) 129   Ht 6' 4\" (1.93 m)   Wt 124 kg (273 lb)   SpO2 100%   BMI 33.23 kg/m²     Physical Exam  Constitutional:       General: He is not in acute distress.     Appearance: He is well-developed.   HENT:      Head: Normocephalic and atraumatic.      Right Ear: Tympanic membrane normal.      Left Ear: Tympanic membrane normal.   Eyes:      Conjunctiva/sclera: Conjunctivae normal.   Cardiovascular:      Rate and Rhythm: Normal rate and regular rhythm.   Pulmonary:      Effort: Pulmonary effort is normal.   Abdominal:      General: Abdomen is flat. Bowel sounds are normal. There is no distension.      Palpations: Abdomen is soft. There is no mass. "   Musculoskeletal:         General: Normal range of motion.      Cervical back: Normal range of motion.   Skin:     General: Skin is warm.      Findings: No rash.   Neurological:      Mental Status: He is alert and oriented to person, place, and time.   Psychiatric:         Mood and Affect: Mood normal.

## 2025-03-17 NOTE — ASSESSMENT & PLAN NOTE
Patient is still having some significant symptoms.  His symptoms are usually daily and he has been limiting Xanax use to water 2 times per week.  Patient does not feel that the Lexapro 5 mg works as well as it once did when he was off of it for several months and then restarted.  Recommend bumping it up to 10 mg daily.  Recommend reassessing in 6 weeks.  Orders:  •  CBC and differential  •  Comprehensive metabolic panel  •  Lipid Panel with Direct LDL reflex  •  TSH, 3rd generation with Free T4 reflex  •  Hemoglobin A1C

## 2025-03-17 NOTE — ASSESSMENT & PLAN NOTE
Status unknown.  Recommend patient getting blood work done and following up in the next 6 weeks.  He does have some family history of diabetes present.  Orders:  •  CBC and differential  •  Comprehensive metabolic panel  •  Lipid Panel with Direct LDL reflex  •  TSH, 3rd generation with Free T4 reflex  •  Hemoglobin A1C

## 2025-03-17 NOTE — ASSESSMENT & PLAN NOTE
Recommend reassessing labs.  Continue healthy diet and exercise.  Orders:  •  CBC and differential  •  Comprehensive metabolic panel  •  Lipid Panel with Direct LDL reflex  •  TSH, 3rd generation with Free T4 reflex  •  Hemoglobin A1C